# Patient Record
Sex: FEMALE | Race: WHITE | NOT HISPANIC OR LATINO
[De-identification: names, ages, dates, MRNs, and addresses within clinical notes are randomized per-mention and may not be internally consistent; named-entity substitution may affect disease eponyms.]

---

## 2017-06-30 PROBLEM — Z00.00 ENCOUNTER FOR PREVENTIVE HEALTH EXAMINATION: Status: ACTIVE | Noted: 2017-06-30

## 2017-07-12 ENCOUNTER — APPOINTMENT (OUTPATIENT)
Dept: VASCULAR SURGERY | Facility: CLINIC | Age: 82
End: 2017-07-12

## 2017-07-12 VITALS — BODY MASS INDEX: 19.31 KG/M2 | WEIGHT: 92 LBS | HEIGHT: 58 IN

## 2017-07-12 DIAGNOSIS — Z78.9 OTHER SPECIFIED HEALTH STATUS: ICD-10-CM

## 2017-07-12 DIAGNOSIS — Z80.0 FAMILY HISTORY OF MALIGNANT NEOPLASM OF DIGESTIVE ORGANS: ICD-10-CM

## 2017-07-12 DIAGNOSIS — Z87.891 PERSONAL HISTORY OF NICOTINE DEPENDENCE: ICD-10-CM

## 2017-07-12 DIAGNOSIS — I65.29 OCCLUSION AND STENOSIS OF UNSPECIFIED CAROTID ARTERY: ICD-10-CM

## 2017-07-12 DIAGNOSIS — Z86.39 PERSONAL HISTORY OF OTHER ENDOCRINE, NUTRITIONAL AND METABOLIC DISEASE: ICD-10-CM

## 2017-07-12 DIAGNOSIS — Z86.79 PERSONAL HISTORY OF OTHER DISEASES OF THE CIRCULATORY SYSTEM: ICD-10-CM

## 2017-07-12 DIAGNOSIS — Z60.2 PROBLEMS RELATED TO LIVING ALONE: ICD-10-CM

## 2017-07-12 DIAGNOSIS — R20.0 ANESTHESIA OF SKIN: ICD-10-CM

## 2017-07-12 SDOH — SOCIAL STABILITY - SOCIAL INSECURITY: PROBLEMS RELATED TO LIVING ALONE: Z60.2

## 2017-07-14 PROBLEM — Z87.891 FORMER SMOKER: Status: ACTIVE | Noted: 2017-07-12

## 2017-07-14 PROBLEM — Z86.39 HISTORY OF HIGH CHOLESTEROL: Status: RESOLVED | Noted: 2017-07-12 | Resolved: 2017-07-14

## 2017-07-14 PROBLEM — Z86.79 HISTORY OF CARDIAC DISORDER: Status: RESOLVED | Noted: 2017-07-12 | Resolved: 2017-07-14

## 2017-07-14 PROBLEM — R20.0 NUMBNESS IN BOTH LEGS: Status: ACTIVE | Noted: 2017-07-14

## 2017-07-14 PROBLEM — I65.29 CAROTID ARTERY PLAQUE: Status: ACTIVE | Noted: 2017-07-12

## 2017-07-14 PROBLEM — Z78.9 EXERCISES OCCASIONALLY: Status: ACTIVE | Noted: 2017-07-12

## 2017-07-14 PROBLEM — Z60.2 LIVES ALONE WITH HELP AVAILABLE: Status: ACTIVE | Noted: 2017-07-12

## 2018-07-28 PROBLEM — Z80.0 FAMILY HISTORY OF COLON CANCER: Status: ACTIVE | Noted: 2017-07-12

## 2018-10-24 ENCOUNTER — RX RENEWAL (OUTPATIENT)
Age: 83
End: 2018-10-24

## 2019-03-25 ENCOUNTER — RECORD ABSTRACTING (OUTPATIENT)
Age: 84
End: 2019-03-25

## 2019-03-25 DIAGNOSIS — Z87.19 PERSONAL HISTORY OF OTHER DISEASES OF THE DIGESTIVE SYSTEM: ICD-10-CM

## 2019-03-25 DIAGNOSIS — Z86.69 PERSONAL HISTORY OF OTHER DISEASES OF THE NERVOUS SYSTEM AND SENSE ORGANS: ICD-10-CM

## 2019-03-25 DIAGNOSIS — Z87.11 PERSONAL HISTORY OF PEPTIC ULCER DISEASE: ICD-10-CM

## 2019-03-25 DIAGNOSIS — Z86.19 PERSONAL HISTORY OF OTHER INFECTIOUS AND PARASITIC DISEASES: ICD-10-CM

## 2019-03-25 DIAGNOSIS — Z85.038 PERSONAL HISTORY OF OTHER MALIGNANT NEOPLASM OF LARGE INTESTINE: ICD-10-CM

## 2019-03-25 DIAGNOSIS — R10.13 EPIGASTRIC PAIN: ICD-10-CM

## 2019-03-25 DIAGNOSIS — K22.10 ULCER OF ESOPHAGUS W/OUT BLEEDING: ICD-10-CM

## 2019-03-25 DIAGNOSIS — Z87.442 PERSONAL HISTORY OF URINARY CALCULI: ICD-10-CM

## 2019-03-25 DIAGNOSIS — Z82.49 FAMILY HISTORY OF ISCHEMIC HEART DISEASE AND OTHER DISEASES OF THE CIRCULATORY SYSTEM: ICD-10-CM

## 2019-03-25 DIAGNOSIS — K64.9 UNSPECIFIED HEMORRHOIDS: ICD-10-CM

## 2019-03-25 DIAGNOSIS — K29.60 OTHER GASTRITIS W/OUT BLEEDING: ICD-10-CM

## 2019-03-25 LAB — CYTOLOGY CVX/VAG DOC THIN PREP: NORMAL

## 2019-03-25 RX ORDER — MULTIVITAMIN
CAPSULE ORAL
Refills: 0 | Status: ACTIVE | COMMUNITY

## 2019-03-25 RX ORDER — POLYETHYLENE GLYCOL 400 AND PROPYLENE GLYCOL 4; 3 MG/ML; MG/ML
0.4-0.3 SOLUTION/ DROPS OPHTHALMIC
Refills: 0 | Status: ACTIVE | COMMUNITY

## 2019-04-11 ENCOUNTER — APPOINTMENT (OUTPATIENT)
Dept: CARDIOLOGY | Facility: CLINIC | Age: 84
End: 2019-04-11
Payer: MEDICARE

## 2019-04-11 VITALS
DIASTOLIC BLOOD PRESSURE: 64 MMHG | BODY MASS INDEX: 19.94 KG/M2 | HEIGHT: 58 IN | HEART RATE: 80 BPM | WEIGHT: 95 LBS | SYSTOLIC BLOOD PRESSURE: 146 MMHG

## 2019-04-11 DIAGNOSIS — I67.9 CEREBROVASCULAR DISEASE, UNSPECIFIED: ICD-10-CM

## 2019-04-11 PROCEDURE — 93288 INTERROG EVL PM/LDLS PM IP: CPT

## 2019-04-11 PROCEDURE — 99214 OFFICE O/P EST MOD 30 MIN: CPT | Mod: 25

## 2019-04-11 PROCEDURE — 93000 ELECTROCARDIOGRAM COMPLETE: CPT | Mod: 59

## 2019-04-11 NOTE — HISTORY OF PRESENT ILLNESS
[FreeTextEntry1] : The patient suffers from chronic dizziness and vertigo this has been going on for many years. Echocardiogram done at the hospital reveals an ejection fraction of 35-40% akinesis of the anterior septum apex and distal anterior wall grade 1 diastolic dysfunction. Normal LV dimensions and contraction pattern. Mild aortic. Mild MR. Moderate TR. Moderate pulmonary hypertension.the patient was discharged from [facility] in stable condition.

## 2019-04-11 NOTE — REASON FOR VISIT
[FreeTextEntry1] : Patient was recently treated at [facility] with symptoms of shoulder and arm pain. The diagnosis was coronary syndrome. The patient indicates that there was a second admission with similar symptoms both times the symptoms came under control. Patient is not amenable to medications because of dizziness. The patient's condition has stabilized.

## 2019-04-11 NOTE — PHYSICAL EXAM
[General Appearance - Well Developed] : well developed [Normal Appearance] : normal appearance [Well Groomed] : well groomed [General Appearance - Well Nourished] : well nourished [No Deformities] : no deformities [Normal Conjunctiva] : the conjunctiva exhibited no abnormalities [General Appearance - In No Acute Distress] : no acute distress [Normal Oral Mucosa] : normal oral mucosa [Eyelids - No Xanthelasma] : the eyelids demonstrated no xanthelasmas [No Oral Pallor] : no oral pallor [No Oral Cyanosis] : no oral cyanosis [Normal Jugular Venous A Waves Present] : normal jugular venous A waves present [Normal Jugular Venous V Waves Present] : normal jugular venous V waves present [No Jugular Venous Ziegler A Waves] : no jugular venous ziegler A waves [Respiration, Rhythm And Depth] : normal respiratory rhythm and effort [Exaggerated Use Of Accessory Muscles For Inspiration] : no accessory muscle use [Auscultation Breath Sounds / Voice Sounds] : lungs were clear to auscultation bilaterally [Heart Rate And Rhythm] : heart rate and rhythm were normal [Heart Sounds] : normal S1 and S2 [Abdomen Tenderness] : non-tender [Murmurs] : no murmurs present [Abdomen Soft] : soft [Abdomen Mass (___ Cm)] : no abdominal mass palpated [Abnormal Walk] : normal gait [Gait - Sufficient For Exercise Testing] : the gait was sufficient for exercise testing [Petechial Hemorrhages (___cm)] : no petechial hemorrhages [Nail Clubbing] : no clubbing of the fingernails [Cyanosis, Localized] : no localized cyanosis [Skin Color & Pigmentation] : normal skin color and pigmentation [] : no rash [No Venous Stasis] : no venous stasis [Skin Lesions] : no skin lesions [Oriented To Time, Place, And Person] : oriented to person, place, and time [No Skin Ulcers] : no skin ulcer [No Xanthoma] : no  xanthoma was observed [No Anxiety] : not feeling anxious [Mood] : the mood was normal [Affect] : the affect was normal

## 2019-04-11 NOTE — DISCUSSION/SUMMARY
[FreeTextEntry1] : Patient is doing relatively well following an admission to [facility] with a non-distended. The patient has generally refused medical treatment. However earlier today she did agree to a cardiac medication which will be ordered by her primary care Dr. LYNNE. According to the list I have is from her residence she is also receiving Toprol-XL 50 mg daily. Aspirin 81 mg daily. Patient indicates that she is receiving only aspirin and nothing else. She has had adverse reactions to almost all medications that she has been given which seemed to exacerbate chronic dizziness and vertigo. On the plus side her examination today reveals no evidence of fluid retention and there is no evidence of active ischemia. Electrocardiogram does reveal sinus rhythm with marked T-wave inversion across the precordial leads indicative of recent nondistended myocardial infarction. Conservative management for this recommended for this patient.Patient has been extremely sedentary. I thought she would benefit from a program of mild exercise or physical therapy. However she does not believe in physical therapy. 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

## 2019-04-15 ENCOUNTER — RX RENEWAL (OUTPATIENT)
Age: 84
End: 2019-04-15

## 2019-04-15 RX ORDER — UMECLIDINIUM BROMIDE AND VILANTEROL TRIFENATATE 62.5; 25 UG/1; UG/1
62.5-25 POWDER RESPIRATORY (INHALATION)
Qty: 1 | Refills: 5 | Status: ACTIVE | COMMUNITY
Start: 2018-10-24 | End: 1900-01-01

## 2019-07-17 ENCOUNTER — APPOINTMENT (OUTPATIENT)
Dept: VASCULAR SURGERY | Facility: CLINIC | Age: 84
End: 2019-07-17

## 2019-07-23 ENCOUNTER — NON-APPOINTMENT (OUTPATIENT)
Age: 84
End: 2019-07-23

## 2019-07-23 ENCOUNTER — APPOINTMENT (OUTPATIENT)
Dept: CARDIOLOGY | Facility: CLINIC | Age: 84
End: 2019-07-23
Payer: MEDICARE

## 2019-07-23 VITALS
HEIGHT: 58 IN | WEIGHT: 94 LBS | HEART RATE: 87 BPM | BODY MASS INDEX: 19.73 KG/M2 | SYSTOLIC BLOOD PRESSURE: 150 MMHG | DIASTOLIC BLOOD PRESSURE: 85 MMHG

## 2019-07-23 DIAGNOSIS — R06.09 OTHER FORMS OF DYSPNEA: ICD-10-CM

## 2019-07-23 PROCEDURE — 93000 ELECTROCARDIOGRAM COMPLETE: CPT

## 2019-07-23 PROCEDURE — 99214 OFFICE O/P EST MOD 30 MIN: CPT

## 2019-07-23 RX ORDER — HYDROCORTISONE 25 MG/G
2.5 CREAM TOPICAL
Refills: 0 | Status: DISCONTINUED | COMMUNITY
End: 2019-07-23

## 2019-07-23 NOTE — HISTORY OF PRESENT ILLNESS
[FreeTextEntry1] : He does notice shortness of breath with exertion which resolves when she stops to rest. However there is no chest pain.

## 2019-07-23 NOTE — PHYSICAL EXAM
[General Appearance - Well Developed] : well developed [Well Groomed] : well groomed [Normal Appearance] : normal appearance [General Appearance - Well Nourished] : well nourished [General Appearance - In No Acute Distress] : no acute distress [No Deformities] : no deformities [Eyelids - No Xanthelasma] : the eyelids demonstrated no xanthelasmas [Normal Conjunctiva] : the conjunctiva exhibited no abnormalities [Normal Jugular Venous A Waves Present] : normal jugular venous A waves present [Normal Jugular Venous V Waves Present] : normal jugular venous V waves present [No Jugular Venous Ziegler A Waves] : no jugular venous ziegler A waves [Exaggerated Use Of Accessory Muscles For Inspiration] : no accessory muscle use [Respiration, Rhythm And Depth] : normal respiratory rhythm and effort [Heart Sounds] : normal S1 and S2 [Heart Rate And Rhythm] : heart rate and rhythm were normal [Auscultation Breath Sounds / Voice Sounds] : lungs were clear to auscultation bilaterally [Abdomen Soft] : soft [Murmurs] : no murmurs present [Abdomen Tenderness] : non-tender [Abdomen Mass (___ Cm)] : no abdominal mass palpated [FreeTextEntry1] : diminished le pulses. [Gait - Sufficient For Exercise Testing] : the gait was sufficient for exercise testing [Abnormal Walk] : normal gait [] : no rash [No Venous Stasis] : no venous stasis [Skin Color & Pigmentation] : normal skin color and pigmentation [Skin Lesions] : no skin lesions [No Skin Ulcers] : no skin ulcer [No Xanthoma] : no  xanthoma was observed [Affect] : the affect was normal [Mood] : the mood was normal [Oriented To Time, Place, And Person] : oriented to person, place, and time [No Anxiety] : not feeling anxious

## 2019-07-23 NOTE — DISCUSSION/SUMMARY
[FreeTextEntry1] : The patient's cardiac patient today is stable. There appears to be no evidence of CHF or coronary ischemia. The patient does describe shortness of breath with mild exertion. She was a 2 pack per day cigarette smoker in the past. She carries a diagnosis of COPD and she has responded previously to ANORO ELLIPTA INHALER. This agent is a dual action nonsteroidal bronchodilator. Today's exam reveals clear lung fields normal heart sounds no significant edema. Popliteal and pedal pulses are diminished the electrocardiogram reveals sinus rhythm with low voltage in limb leads and nonspecific ST-T wave abnormalities. The patient is low to take virtually any medications. She is currently receiving only aspirin 81 mg daily. Her past history does include pulmonary embolization following coronary bypass surgery in 2015. The patient's operation consisted of 4 vessel bypass including left internal mammary artery to the LAD vein graft edema graft OM1 and vein graft to the RCA on March 20, 2015 performed by Dr. Lew [facility]. Her to surgery and to sounds and 6 he underwent stenting of the right coronary artery and ramus intermedius coronary artery in 2007. She suffered a non-ST segment elevation MI prior to the coronary bypass operation. Cardiac catheterization revealed left main and severe three-vessel coronary disease with preserved left ventricular function. 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 The patient suffered another coronary event in March of 2019. Patient was admitted to [facility] in April of this year with shoulder and arm pain. The new echocardiogram done at [place] revealed an ejection fraction of 35-40% with akinesis of the anterior septum apex and distal anterior wall and grade 1 diastolic dysfunction. There was mild MR moderate T11 moderate pulmonary hypertension. Prior to this last coronary event in the spring of 2019 the patient described pain mostly upper back and into both shoulders and both upper arms. These symptoms have all resolved.

## 2019-07-23 NOTE — REASON FOR VISIT
[FreeTextEntry1] : The patient returns for followup today. She is followed with the diagnosis of coronary artery disease, status post coronary bypass complicated by postop pulmonary embolism. The patient suffers from chronic severe dizziness and vertigo. This affects her walking and her balance. She is able to walk with a walker.

## 2019-08-07 ENCOUNTER — APPOINTMENT (OUTPATIENT)
Dept: VASCULAR SURGERY | Facility: CLINIC | Age: 84
End: 2019-08-07
Payer: MEDICARE

## 2019-08-07 VITALS — HEART RATE: 86 BPM | DIASTOLIC BLOOD PRESSURE: 84 MMHG | SYSTOLIC BLOOD PRESSURE: 162 MMHG

## 2019-08-07 PROCEDURE — 93922 UPR/L XTREMITY ART 2 LEVELS: CPT

## 2019-08-07 PROCEDURE — 99213 OFFICE O/P EST LOW 20 MIN: CPT

## 2019-08-07 NOTE — HISTORY OF PRESENT ILLNESS
[de-identified] : 89 yo female previously seen for peripheral arterial disease returns in follow up. She reports that she continues to have dizziness making it difficult for her to walk. She currenlty is able to walk with a walker. She denies pain in her legs when she walks. She denies pain at rest.  [FreeTextEntry1] : 85 yo female with multiple medical problems presents complaining of worsening thigh and foot numbness. 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 She developed an inner ear infection approximately 14 years ago. She has been minimally ambulatory since then.

## 2019-08-07 NOTE — REVIEW OF SYSTEMS
[Fever] : no fever [Feeling Tired] : feeling tired [Chills] : no chills [Eyesight Problems] : eyesight problems [Sore Throat] : sore throat [Limb Swelling] : limb swelling [SOB on Exertion] : shortness of breath during exertion [Dizziness] : dizziness

## 2019-08-07 NOTE — ASSESSMENT
[FreeTextEntry1] : 89 yo female with multiple medical problems. She has evidence of peripheral artery disease on physical exam and arterial testing. She ambulates with a walker without pain,. She denies pain at rest. I do not think there is an indication for vascular intervention at this time. She will follow up with me if she develops pain at rest or a nonhealing ulceration.\par \par Follow up on a PRN basis.

## 2019-08-07 NOTE — PHYSICAL EXAM
[JVD] : no jugular venous distention  [Carotid Bruits] : carotid bruit  [Normal Breath Sounds] : Normal breath sounds [Normal Rate and Rhythm] : normal rate and rhythm [2+] : left 2+ [0] : left 0 [Ankle Swelling (On Exam)] : not present [Ankle Swelling Bilaterally] : bilaterally  [Alert] : alert [Oriented to Person] : oriented to person [Oriented to Place] : oriented to place [Oriented to Time] : oriented to time [de-identified] : Awake and Alert [de-identified] : no ulceration or skin breakdown [de-identified] : appropriate

## 2019-11-25 ENCOUNTER — NON-APPOINTMENT (OUTPATIENT)
Age: 84
End: 2019-11-25

## 2019-11-25 ENCOUNTER — APPOINTMENT (OUTPATIENT)
Dept: CARDIOLOGY | Facility: CLINIC | Age: 84
End: 2019-11-25
Payer: MEDICARE

## 2019-11-25 VITALS
SYSTOLIC BLOOD PRESSURE: 125 MMHG | BODY MASS INDEX: 19.94 KG/M2 | DIASTOLIC BLOOD PRESSURE: 70 MMHG | HEART RATE: 76 BPM | HEIGHT: 58 IN | WEIGHT: 95 LBS

## 2019-11-25 DIAGNOSIS — Z87.39 PERSONAL HISTORY OF OTHER DISEASES OF THE MUSCULOSKELETAL SYSTEM AND CONNECTIVE TISSUE: ICD-10-CM

## 2019-11-25 DIAGNOSIS — Z87.898 PERSONAL HISTORY OF OTHER SPECIFIED CONDITIONS: ICD-10-CM

## 2019-11-25 DIAGNOSIS — Z86.79 PERSONAL HISTORY OF OTHER DISEASES OF THE CIRCULATORY SYSTEM: ICD-10-CM

## 2019-11-25 PROCEDURE — 93000 ELECTROCARDIOGRAM COMPLETE: CPT

## 2019-11-25 PROCEDURE — 99214 OFFICE O/P EST MOD 30 MIN: CPT

## 2019-11-25 NOTE — REASON FOR VISIT
[FreeTextEntry1] : The patient is followed with the diagnosis of chronic coronary artery disease status post coronary bypass surgery. The operation was performed at [facility] in the patient's postop course was complicated by a pulmonary embolism. Date of surgery was March 2015. Currently the patient offers no acute cardiac symptoms. There has been chronic shortness of breath on exertion which resolves right away with rest. The patient has chronic significant dizziness. She is also sensitive to numerous medications.

## 2019-11-25 NOTE — PHYSICAL EXAM
[General Appearance - Well Developed] : well developed [Normal Appearance] : normal appearance [Well Groomed] : well groomed [General Appearance - Well Nourished] : well nourished [No Deformities] : no deformities [General Appearance - In No Acute Distress] : no acute distress [Eyelids - No Xanthelasma] : the eyelids demonstrated no xanthelasmas [Normal Conjunctiva] : the conjunctiva exhibited no abnormalities [Normal Oral Mucosa] : normal oral mucosa [No Oral Pallor] : no oral pallor [No Oral Cyanosis] : no oral cyanosis [Normal Jugular Venous A Waves Present] : normal jugular venous A waves present [No Jugular Venous Ziegler A Waves] : no jugular venous ziegler A waves [Normal Jugular Venous V Waves Present] : normal jugular venous V waves present [Heart Sounds] : normal S1 and S2 [Heart Rate And Rhythm] : heart rate and rhythm were normal [Abnormal Walk] : normal gait [Gait - Sufficient For Exercise Testing] : the gait was sufficient for exercise testing [Nail Clubbing] : no clubbing of the fingernails [Petechial Hemorrhages (___cm)] : no petechial hemorrhages [Cyanosis, Localized] : no localized cyanosis [FreeTextEntry1] : DIMINISHED POPLTEAL AND PEDAL PULSES. [Skin Color & Pigmentation] : normal skin color and pigmentation [] : no rash [No Venous Stasis] : no venous stasis [Skin Lesions] : no skin lesions [No Skin Ulcers] : no skin ulcer [No Xanthoma] : no  xanthoma was observed [Affect] : the affect was normal [Oriented To Time, Place, And Person] : oriented to person, place, and time [Mood] : the mood was normal [No Anxiety] : not feeling anxious

## 2019-11-25 NOTE — DISCUSSION/SUMMARY
[FreeTextEntry1] : The patient's cardiac examination today is stable. Blood pressure is normal electrocardiogram reveals no acute changes. The patient has chronic severe dizziness which has always been her main complaint. 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 She has been advised to see a vascular specialist by her primary care and I would concur with that opinion although I doubt whether any intervention would be possible at this time. The patient's only medication is baby aspirin one daily this will be continued. She is sensitive and allergic to a host of different medications.

## 2019-12-11 ENCOUNTER — APPOINTMENT (OUTPATIENT)
Dept: VASCULAR SURGERY | Facility: CLINIC | Age: 84
End: 2019-12-11
Payer: MEDICARE

## 2019-12-11 DIAGNOSIS — R60.0 LOCALIZED EDEMA: ICD-10-CM

## 2019-12-11 DIAGNOSIS — I73.9 PERIPHERAL VASCULAR DISEASE, UNSPECIFIED: ICD-10-CM

## 2019-12-11 PROCEDURE — 99213 OFFICE O/P EST LOW 20 MIN: CPT

## 2019-12-11 NOTE — PHYSICAL EXAM
[Carotid Bruits] : carotid bruit  [JVD] : no jugular venous distention  [Normal Breath Sounds] : Normal breath sounds [Normal Rate and Rhythm] : normal rate and rhythm [2+] : left 2+ [0] : left 0 [Ankle Swelling (On Exam)] : present [Ankle Swelling On The Right] : mild [Ankle Swelling Bilaterally] : bilaterally  [Alert] : alert [Oriented to Person] : oriented to person [Oriented to Place] : oriented to place [Oriented to Time] : oriented to time [de-identified] : Awake and Alert [de-identified] : appropriate [de-identified] : no ulceration or skin breakdown [FreeTextEntry1] : monophasic pedal signals bl

## 2019-12-11 NOTE — ASSESSMENT
[FreeTextEntry1] : 89 yo female with multiple medical problems. She has evidence of peripheral artery disease on physical examShe ambulates with a walker without pain,. She denies pain at rest. She has no ulcerations or skin breakdown. I do not think there is an indication for vascular intervention at this time. \par \par Edema - ? dependant edema. i recommended that the patient elevate her legs as much as possible. I encouraged her to walk as much as possible\par \par She will follow up with me if she develops pain at rest or a nonhealing ulceration.\par \par Follow up on a PRN basis.

## 2019-12-11 NOTE — REVIEW OF SYSTEMS
[Chills] : no chills [Fever] : no fever [Eyesight Problems] : eyesight problems [Feeling Tired] : feeling tired [Loss Of Hearing] : hearing loss [Earache] : earache [SOB on Exertion] : shortness of breath during exertion [Limb Swelling] : limb swelling [Difficulty Walking] : difficulty walking [Dizziness] : dizziness

## 2019-12-11 NOTE — HISTORY OF PRESENT ILLNESS
[de-identified] : 87 yo female previously seen for peripheral arterial disease returns in follow up. She reports that she continues to have dizziness making it difficult for her to walk. She continues to walk with  a walker. She denies pain in her legs when she walks. She denies pain at rest. She denies ulceration or skin breakdown. She reports continued edema of the lower extremities. She does not wear compression stockings.  [FreeTextEntry1] : 85 yo female with multiple medical problems presents complaining of worsening thigh and foot numbness. 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 She developed an inner ear infection approximately 14 years ago. She has been minimally ambulatory since then.

## 2020-08-07 ENCOUNTER — APPOINTMENT (OUTPATIENT)
Dept: CARDIOLOGY | Facility: CLINIC | Age: 85
End: 2020-08-07

## 2020-08-28 ENCOUNTER — APPOINTMENT (OUTPATIENT)
Dept: CARDIOLOGY | Facility: CLINIC | Age: 85
End: 2020-08-28
Payer: MEDICARE

## 2020-08-28 DIAGNOSIS — E78.5 HYPERLIPIDEMIA, UNSPECIFIED: ICD-10-CM

## 2020-08-28 DIAGNOSIS — I48.0 PAROXYSMAL ATRIAL FIBRILLATION: ICD-10-CM

## 2020-08-28 DIAGNOSIS — I26.99 OTHER PULMONARY EMBOLISM W/OUT ACUTE COR PULMONALE: ICD-10-CM

## 2020-08-28 DIAGNOSIS — I25.10 ATHEROSCLEROTIC HEART DISEASE OF NATIVE CORONARY ARTERY W/OUT ANGINA PECTORIS: ICD-10-CM

## 2020-08-28 DIAGNOSIS — R60.9 EDEMA, UNSPECIFIED: ICD-10-CM

## 2020-08-28 DIAGNOSIS — Z95.1 PRESENCE OF AORTOCORONARY BYPASS GRAFT: ICD-10-CM

## 2020-08-28 DIAGNOSIS — Z95.5 PRESENCE OF CORONARY ANGIOPLASTY IMPLANT AND GRAFT: ICD-10-CM

## 2020-08-28 DIAGNOSIS — J44.9 CHRONIC OBSTRUCTIVE PULMONARY DISEASE, UNSPECIFIED: ICD-10-CM

## 2020-08-28 PROCEDURE — 99213 OFFICE O/P EST LOW 20 MIN: CPT

## 2020-08-28 RX ORDER — FUROSEMIDE 20 MG/1
20 TABLET ORAL
Qty: 30 | Refills: 3 | Status: ACTIVE | COMMUNITY
Start: 2020-08-28

## 2020-08-28 NOTE — HISTORY OF PRESENT ILLNESS
[Medical Office: ([facility])___] : at the medical office located in  [Other Location: e.g. School (Enter Location, City,State)___] : at [unfilled], at the time of the visit. [Care Coordinator] : care coordinator [Verbal consent obtained from patient] : the patient, [unfilled]

## 2020-08-31 NOTE — DISCUSSION/SUMMARY
[FreeTextEntry1] : Based on my interview with the patient as well as the report from the medical personnel at her nursing facility I am very pleased with the patient's condition. I am recommending no changes in her medications at the present time.\par \par IT SHOULD BE NOTED THAT THE PT'S CURRENT OF FUROSEMIDE IS 10MG DAILY.

## 2020-08-31 NOTE — REASON FOR VISIT
[FreeTextEntry1] : I held a telephone visit with the patient this afternoon. The patient's clinical condition has been stable. There have been no acute cardiac symptoms. She recently was given a diagnosis of COPD and was treated with a bronchodilator inhaler which has been effective in improving her respiratory status. There is also a recent diagnosis of lower extremity edema for which the patient receives furosemide 10 mg. She also received aspirin 81 mg and no other cardiac medications. It should be noted that the patient does not generally approve of pharmacological treatment. I am advised that the patient's recent blood pressure was 112/62 pulse 64.

## 2025-06-06 ENCOUNTER — RESULT REVIEW (OUTPATIENT)
Age: 89
End: 2025-06-06

## 2025-06-18 ENCOUNTER — TRANSCRIPTION ENCOUNTER (OUTPATIENT)
Age: 89
End: 2025-06-18